# Patient Record
Sex: FEMALE | Race: WHITE | NOT HISPANIC OR LATINO | URBAN - METROPOLITAN AREA
[De-identification: names, ages, dates, MRNs, and addresses within clinical notes are randomized per-mention and may not be internally consistent; named-entity substitution may affect disease eponyms.]

---

## 2017-05-02 ENCOUNTER — EMERGENCY (EMERGENCY)
Facility: HOSPITAL | Age: 82
LOS: 1 days | Discharge: HOME | End: 2017-05-02
Attending: EMERGENCY MEDICINE
Payer: MEDICARE

## 2017-05-02 VITALS
SYSTOLIC BLOOD PRESSURE: 147 MMHG | HEART RATE: 77 BPM | TEMPERATURE: 97 F | DIASTOLIC BLOOD PRESSURE: 79 MMHG | OXYGEN SATURATION: 98 % | RESPIRATION RATE: 18 BRPM

## 2017-05-02 VITALS
DIASTOLIC BLOOD PRESSURE: 85 MMHG | HEIGHT: 64 IN | OXYGEN SATURATION: 95 % | TEMPERATURE: 98 F | HEART RATE: 79 BPM | RESPIRATION RATE: 18 BRPM | SYSTOLIC BLOOD PRESSURE: 160 MMHG | WEIGHT: 119.93 LBS

## 2017-05-02 DIAGNOSIS — S00.03XA CONTUSION OF SCALP, INITIAL ENCOUNTER: ICD-10-CM
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
## 2017-05-02 DIAGNOSIS — M62.81 MUSCLE WEAKNESS (GENERALIZED): ICD-10-CM
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
## 2017-05-02 DIAGNOSIS — Z79.899 OTHER LONG TERM (CURRENT) DRUG THERAPY: ICD-10-CM

## 2017-05-02 PROCEDURE — 99284 EMERGENCY DEPT VISIT MOD MDM: CPT

## 2017-05-02 PROCEDURE — 99284 EMERGENCY DEPT VISIT MOD MDM: CPT | Mod: 25

## 2017-05-02 PROCEDURE — 70450 CT HEAD/BRAIN W/O DYE: CPT | Mod: 26

## 2017-05-02 PROCEDURE — 73562 X-RAY EXAM OF KNEE 3: CPT

## 2017-05-02 PROCEDURE — 73562 X-RAY EXAM OF KNEE 3: CPT | Mod: 26,50

## 2017-05-02 PROCEDURE — 70450 CT HEAD/BRAIN W/O DYE: CPT

## 2017-05-02 RX ORDER — HYDROXYCHLOROQUINE SULFATE 200 MG
0 TABLET ORAL
Qty: 0 | Refills: 0 | COMMUNITY

## 2017-05-02 RX ORDER — SIMVASTATIN 20 MG/1
0 TABLET, FILM COATED ORAL
Qty: 0 | Refills: 0 | COMMUNITY

## 2017-05-02 RX ORDER — METHOTREXATE 2.5 MG/1
0 TABLET ORAL
Qty: 0 | Refills: 0 | COMMUNITY

## 2017-05-02 RX ORDER — FOLIC ACID 0.8 MG
0 TABLET ORAL
Qty: 0 | Refills: 0 | COMMUNITY

## 2017-05-02 RX ORDER — ACETAMINOPHEN 500 MG
650 TABLET ORAL ONCE
Qty: 0 | Refills: 0 | Status: COMPLETED | OUTPATIENT
Start: 2017-05-02 | End: 2017-05-02

## 2017-05-02 RX ADMIN — Medication 650 MILLIGRAM(S): at 18:05

## 2017-05-02 NOTE — ED ADULT NURSE NOTE - OBJECTIVE STATEMENT
Patient found laying in stretcher, awake, alert, and oriented times 3,  breathing unlabored.  Patient states her left knee gave out and fell, hitting head on bathroom sink.  No bruising or deformity noted to chest or back.  bump to head noted to posterior right head.  No LOC.  patient remembers incident

## 2017-05-02 NOTE — ED PROVIDER NOTE - OBJECTIVE STATEMENT
89 y/o female with a h/o arthritis and she says she goes to [facility] and uses a walker and today she was there and her left knee gave out and she fell and hit the back of her head and c/o small bump on the back of her head and today she also noted her left knee gave out and her daughter says this has happened before no loc and no focal weakness

## 2017-05-02 NOTE — ED ADULT NURSE REASSESSMENT NOTE - NS ED NURSE REASSESS COMMENT FT1
Care assumed from off-going RN at this time, charting as noted. Pt is resting comfortably in stretcher with family at the bedside. Plan of care explained, pt is waiting for head CT results. Will continue to monitor.

## 2017-05-02 NOTE — ED PROVIDER NOTE - PROGRESS NOTE DETAILS
ctr scan as noted discussed yoselin nielsen neurosurgery states not related to trauma but atrophy and age ok to d/c

## 2017-05-02 NOTE — ED ADULT TRIAGE NOTE - CHIEF COMPLAINT QUOTE
PAtient BIBA, patient states her left leg became weak today, states last known well 12pm, patient states she fell from left leg becoming weak and hit back of head, denies LOC, denies taking any blood thinners, Dr Harden to evaluate to r/o stroke

## 2017-05-10 ENCOUNTER — APPOINTMENT (OUTPATIENT)
Dept: ORTHOPEDIC SURGERY | Facility: CLINIC | Age: 82
End: 2017-05-10

## 2017-05-10 VITALS
WEIGHT: 139 LBS | SYSTOLIC BLOOD PRESSURE: 175 MMHG | DIASTOLIC BLOOD PRESSURE: 83 MMHG | BODY MASS INDEX: 25.58 KG/M2 | HEART RATE: 73 BPM | HEIGHT: 62 IN

## 2017-05-10 DIAGNOSIS — Z60.2 PROBLEMS RELATED TO LIVING ALONE: ICD-10-CM

## 2017-05-10 DIAGNOSIS — Z87.39 PERSONAL HISTORY OF OTHER DISEASES OF THE MUSCULOSKELETAL SYSTEM AND CONNECTIVE TISSUE: ICD-10-CM

## 2017-05-10 DIAGNOSIS — Z78.9 OTHER SPECIFIED HEALTH STATUS: ICD-10-CM

## 2017-05-10 DIAGNOSIS — M17.0 BILATERAL PRIMARY OSTEOARTHRITIS OF KNEE: ICD-10-CM

## 2017-05-10 DIAGNOSIS — Z87.891 PERSONAL HISTORY OF NICOTINE DEPENDENCE: ICD-10-CM

## 2017-05-10 RX ORDER — MULTIVITAMIN
TABLET ORAL
Refills: 0 | Status: ACTIVE | COMMUNITY

## 2017-05-10 RX ORDER — FOLIC ACID 20 MG
CAPSULE ORAL
Refills: 0 | Status: ACTIVE | COMMUNITY

## 2017-05-10 RX ORDER — HYDROXYCHLOROQUINE SULFATE 200 MG/1
TABLET ORAL
Refills: 0 | Status: ACTIVE | COMMUNITY

## 2017-05-10 RX ORDER — ALENDRONATE SODIUM 35 MG/1
TABLET ORAL
Refills: 0 | Status: ACTIVE | COMMUNITY

## 2017-05-10 SDOH — SOCIAL STABILITY - SOCIAL INSECURITY: PROBLEMS RELATED TO LIVING ALONE: Z60.2

## 2019-07-16 PROBLEM — M06.9 RHEUMATOID ARTHRITIS, UNSPECIFIED: Chronic | Status: ACTIVE | Noted: 2017-05-02

## 2019-07-31 PROBLEM — S22.009A FRACTURE, THORACIC VERTEBRA: Status: ACTIVE | Noted: 2019-07-31

## 2019-08-02 ENCOUNTER — APPOINTMENT (OUTPATIENT)
Dept: ORTHOPEDIC SURGERY | Facility: CLINIC | Age: 84
End: 2019-08-02
Payer: MEDICARE

## 2019-08-02 VITALS
HEART RATE: 78 BPM | SYSTOLIC BLOOD PRESSURE: 146 MMHG | BODY MASS INDEX: 25.58 KG/M2 | DIASTOLIC BLOOD PRESSURE: 74 MMHG | WEIGHT: 139 LBS | HEIGHT: 62 IN

## 2019-08-02 DIAGNOSIS — Z82.62 FAMILY HISTORY OF OSTEOPOROSIS: ICD-10-CM

## 2019-08-02 DIAGNOSIS — S22.009A UNSPECIFIED FRACTURE OF UNSPECIFIED THORACIC VERTEBRA, INITIAL ENCOUNTER FOR CLOSED FRACTURE: ICD-10-CM

## 2019-08-02 DIAGNOSIS — Z87.39 PERSONAL HISTORY OF OTHER DISEASES OF THE MUSCULOSKELETAL SYSTEM AND CONNECTIVE TISSUE: ICD-10-CM

## 2019-08-02 DIAGNOSIS — Z85.9 PERSONAL HISTORY OF MALIGNANT NEOPLASM, UNSPECIFIED: ICD-10-CM

## 2019-08-02 PROCEDURE — 99214 OFFICE O/P EST MOD 30 MIN: CPT

## 2019-08-02 PROCEDURE — 72070 X-RAY EXAM THORAC SPINE 2VWS: CPT

## 2019-08-02 NOTE — DISCUSSION/SUMMARY
[de-identified] : I would recommend conservative care with this pt, including home therapy, topical modalities, and the use of a walker. She may FU on a PRN basis for c/o back pain.

## 2019-08-02 NOTE — HISTORY OF PRESENT ILLNESS
[Ataxia] : no ataxia [de-identified] : 89 year old female presents with an evaluation for a 3+ yr hx of thoracic pain, thoracic fx. Recent CT Chest/Abdomen that diagnosed rib fractures. Pt states her pain is very well controlled. She denies acute flare up of thoracic pain. Pt ambulates chronically with a waker which I think is age appropriate.  [Loss of Dexterity] : good dexterity [Incontinence] : no incontinence [Urinary Ret.] : no urinary retention

## 2019-08-02 NOTE — PHYSICAL EXAM
[de-identified] : CONSTITUTIONAL: The patient is a very pleasant individual who is well-nourished and who appears stated age. Seated and comfortable. She schuster shave a walker in the room for ambulation. \par PSYCHIATRIC: The patient is alert and oriented X 3 and in no apparent distress, and participates with orthopedic evaluation well.\par HEAD: Atraumatic and is nonsyndromic in appearance.\par EENT: No visible thyromegaly, EOMI.\par RESPIRATORY: Respiratory rate is regular, not dyspneic on examination.\par LYMPHATICS: There is no inguinal lymphadenopathy\par INTEGUMENTARY: Skin is clean, dry, and intact about the bilateral lower extremities and lumbar spine.\par VASCULAR: There is brisk capillary refill about the bilateral lower extremities.\par NEUROLOGIC: There are no pathologic reflexes. There is no decrease in sensation of the bilateral lower extremities on Wartenberg pinwheel examination. Deep tendon reflexes are well maintained at 2+/4 of the bilateral lower extremities and are symmetric. \par MUSCULOSKELETAL: There is no visible muscular atrophy. Manual motor strength is well maintained in the bilateral lower extremities. Range of motion of lumbar spine is well maintained. The patient ambulates in a non-myelopathic manner. Negative tension sign and straight leg raise. She denies progressive weakness/sensory loss.bilaterally. Quad extension, ankle dorsiflexion, EHL, plantar flexion, and ankle eversion are well preserved. Normal secondary orthopaedic exam of bilateral hips, greater trochanteric area, knees and ankles.  [de-identified] : CT scan [facility] 7/13/19 demonstrated thoracic compression fractures at T8 and T12 that appear chronic. \par

## 2019-08-02 NOTE — ADDENDUM
[FreeTextEntry1] : Documented by Montrell Sparks acting as a scribe for Dr. Gary Vazquez on 08/02/2019.\par \par All medical record entries made by the Scribe were at my, Dr. Gary Vazquez, direction and personally dictated by me on 08/02/2019. I have reviewed the chart and agree that the record accurately reflects my personal performance of the history, physical exam, assessment and plan. I have also personally directed, reviewed, and agreed with the chart.

## 2019-10-02 PROBLEM — Z60.2 PERSON LIVING ALONE: Status: ACTIVE | Noted: 2017-05-10

## 2022-01-20 ENCOUNTER — APPOINTMENT (OUTPATIENT)
Dept: RADIATION ONCOLOGY | Facility: CLINIC | Age: 87
End: 2022-01-20
Payer: MEDICARE

## 2022-01-20 ENCOUNTER — OUTPATIENT (OUTPATIENT)
Dept: OUTPATIENT SERVICES | Facility: HOSPITAL | Age: 87
LOS: 1 days | Discharge: HOME | End: 2022-01-20
Payer: OTHER MISCELLANEOUS

## 2022-01-20 VITALS
SYSTOLIC BLOOD PRESSURE: 121 MMHG | HEIGHT: 62 IN | HEART RATE: 92 BPM | OXYGEN SATURATION: 98 % | BODY MASS INDEX: 18.03 KG/M2 | WEIGHT: 98 LBS | DIASTOLIC BLOOD PRESSURE: 72 MMHG

## 2022-01-20 DIAGNOSIS — R91.8 OTHER NONSPECIFIC ABNORMAL FINDING OF LUNG FIELD: ICD-10-CM

## 2022-01-20 DIAGNOSIS — Z85.810 PERSONAL HISTORY OF MALIGNANT NEOPLASM OF TONGUE: ICD-10-CM

## 2022-01-20 DIAGNOSIS — C02.9 MALIGNANT NEOPLASM OF TONGUE, UNSPECIFIED: ICD-10-CM

## 2022-01-20 DIAGNOSIS — C34.81 MALIGNANT NEOPLASM OF OVERLAPPING SITES OF RIGHT BRONCHUS AND LUNG: ICD-10-CM

## 2022-01-20 PROCEDURE — 77263 THER RADIOLOGY TX PLNG CPLX: CPT

## 2022-01-20 PROCEDURE — 99205 OFFICE O/P NEW HI 60 MIN: CPT | Mod: 25

## 2022-01-20 RX ORDER — METHOTREXATE 2.5 MG/1
TABLET ORAL
Refills: 0 | Status: COMPLETED | COMMUNITY
End: 2022-01-20

## 2022-01-20 NOTE — REVIEW OF SYSTEMS
[Recent Change In Weight] : ~T recent weight change [Hearing Disturbances] : hearing disturbances [Shortness Of Breath] : shortness of breath [Cough] : cough [SOB on Exertion] : shortness of breath during exertion [Negative] : Allergic/Immunologic [Dysphagia: Grade 1 - Symptomatic, able to eat regular diet] : Dysphagia: Grade 1 - Symptomatic, able to eat regular diet [Edema Limbs: Grade 0] : Edema Limbs: Grade 0  [Fatigue: Grade 0] : Fatigue: Grade 0 [Localized Edema: Grade 0] : Localized Edema: Grade 0  [Neck Edema: Grade 0] : Neck Edema: Grade 0 [Cough: Grade 1 - Mild symptoms; nonprescription intervention indicated] : Cough: Grade 1 - Mild symptoms; nonprescription intervention indicated [Dyspnea: Grade 1 - Shortness of breath with moderate exertion] : Dyspnea: Grade 1 - Shortness of breath with moderate exertion [FreeTextEntry4] : aides both ears

## 2022-01-20 NOTE — REASON FOR VISIT
[Consideration for Non-Curative Therapy] : consideration for non-curative therapy for [Lung Cancer] : lung cancer [Family Member] : family member

## 2022-01-20 NOTE — OB/GYN HISTORY
[Currently In Menopause] : currently in menopause [___] : Living: [unfilled] [History of Birth Control Pills] : Patient has no history of taking birth control pills [History of Hormone Replacement Therapy] : no history of hormone replacement therapy [Experiencing Menopausal Sxs] : not experiencing menopausal symptoms

## 2022-01-20 NOTE — HISTORY OF PRESENT ILLNESS
[FreeTextEntry1] : 89 year old woman presents today for consultation regarding irradiation therapy for palliation of a lung mass causing obstruction.\par \par Per the patient and her family, she was in her usual state of health until approximately 2 weeks ago when she noted progressive worsening shortness of breath.\par \par Patient also notes pain in her back since Sunday, radiating to the right chest.  Family also noted increased lower extremity edema, right greater than left.\par \par She was seen by Dr. Rae Mehta on 1/18/22, and recommended to undergo chest x-ray.\par \par 1/18/22 CXR showed a right perihilar masslike opacity.\par \par She saw her PMD, Dr. Adrian Lombardi.\par \par 1/19/22 CT chest showed a bulky right hilar mass measuring 7.8 x 5.6 x 5.5 CM, partially involving the right apical bronchus, and obstructing the anterior right upper lobe bronchus.  There was mediastinal and hilar lymphadenopathy, and numerous additional masses were present within both lung fields.  Compression fractures deformities at T8 and T12 were stable.\par \par Of note, comparison was made to 7/13/19 CT chest, which previously demonstrated to right upper lobe lobular lesions measuring 1.8 and 1.6 CM.\par \par Additionally, patient with history of right oral tongue squamous cell carcinoma in 2001, s/p excision at [facility].  No adjuvant therapy.\par \par Currently reports dyspnea at rest and with minimal exertion, dry cough, and intermittent orthopnea.  She was noted to have 10-15 pound weight loss over the past few months.  Denies headaches, nausea, vomiting, vision changes, or ataxia.\par

## 2022-01-20 NOTE — LETTER CLOSING
[Consult Closing:] : Thank you for allowing me to participate in the care of this patient.  If you have any questions, please do not hesitate to contact me. [Sincerely yours,] : Sincerely yours, [FreeTextEntry3] : Marco Chery MD\par  of Radiation Medicine, Quality and Safety\par  of Radiation Medicine\par [facility] [employer] [facility]\par [facility]\par [facility]\par

## 2022-01-20 NOTE — DISEASE MANAGEMENT
[Clinical] : TNM Stage: c [IV] : IV [FreeTextEntry4] : clinical lung cancer [TTNM] : - [NTNM] : - [MTNM] : 1

## 2022-01-20 NOTE — PHYSICAL EXAM
[Normal] : oriented to person, place and time, the affect was normal, the mood was normal and not anxious [Extraocular Movements] : extraocular movements were intact [Respiration, Rhythm And Depth] : normal respiratory rhythm and effort [Exaggerated Use Of Accessory Muscles For Inspiration] : no accessory muscle use [Heart Rate And Rhythm] : heart rate and rhythm were normal [Bowel Sounds] : normal bowel sounds [Abdomen Soft] : soft [Nondistended] : nondistended [Abdomen Tenderness] : non-tender [de-identified] : absent RUL, diminished elsewhere; no wheeze; dyspnea at rest [de-identified] : bilateral LE edema

## 2022-01-20 NOTE — VITALS
[Least Pain Intensity: 0/10] : 0/10 [Date: ____________] : Patient's last distress assessment performed on [unfilled]. [Patient Refusal] : Patient refused psychosocial distress assessment [Maximal Pain Intensity: 3/10] : 3/10 [70: Cares for self; unalbe to carry on normal activity or do active work.] : 70: Cares for self; unable to carry on normal activity or do active work.

## 2022-01-21 PROCEDURE — 77307 TELETHX ISODOSE PLAN CPLX: CPT | Mod: 26

## 2022-01-21 PROCEDURE — 77334 RADIATION TREATMENT AID(S): CPT | Mod: 26

## 2022-01-21 PROCEDURE — 77290 THER RAD SIMULAJ FIELD CPLX: CPT | Mod: 26

## 2022-01-24 PROCEDURE — 77280 THER RAD SIMULAJ FIELD SMPL: CPT | Mod: 26

## 2022-01-25 PROCEDURE — 77427 RADIATION TX MANAGEMENT X5: CPT

## 2022-01-28 ENCOUNTER — NON-APPOINTMENT (OUTPATIENT)
Age: 87
End: 2022-01-28

## 2022-01-28 VITALS
DIASTOLIC BLOOD PRESSURE: 72 MMHG | TEMPERATURE: 97 F | HEIGHT: 62 IN | WEIGHT: 95 LBS | SYSTOLIC BLOOD PRESSURE: 113 MMHG | BODY MASS INDEX: 17.48 KG/M2 | OXYGEN SATURATION: 97 % | RESPIRATION RATE: 18 BRPM | HEART RATE: 85 BPM

## 2022-01-28 NOTE — DISEASE MANAGEMENT
[Clinical] : TNM Stage: c [IV] : IV [FreeTextEntry4] : clinical lung cancer [TTNM] : - [NTNM] : - [MTNM] : 1 [de-identified] : 4595 [de-identified] : 2000 [de-identified] : right lung

## 2022-01-28 NOTE — REASON FOR VISIT
[Routine On-Treatment] : a routine on-treatment visit for [Lung Cancer] : lung cancer [Family Member] : family member

## 2022-01-28 NOTE — REVIEW OF SYSTEMS
[Fatigue: Grade 1 - Fatigue relieved by rest] : Fatigue: Grade 1 - Fatigue relieved by rest [Cough: Grade 1 - Mild symptoms; nonprescription intervention indicated] : Cough: Grade 1 - Mild symptoms; nonprescription intervention indicated [Dyspnea: Grade 3 - Shortness of breath at rest; limiting self care ADL] : Dyspnea: Grade 3 - Shortness of breath at rest; limiting self care ADL [Skin Hyperpigmentation: Grade 0] : Skin Hyperpigmentation: Grade 0

## 2022-01-28 NOTE — PHYSICAL EXAM
[Extraocular Movements] : extraocular movements were intact [] : no respiratory distress [Respiration, Rhythm And Depth] : normal respiratory rhythm and effort [Exaggerated Use Of Accessory Muscles For Inspiration] : no accessory muscle use [Normal] : normal skin color and pigmentation and no rash [de-identified] : decreased with course breath sounds

## 2022-03-01 ENCOUNTER — APPOINTMENT (OUTPATIENT)
Dept: RADIATION ONCOLOGY | Facility: CLINIC | Age: 87
End: 2022-03-01

## 2022-03-01 NOTE — REVIEW OF SYSTEMS
[Fatigue] : fatigue [Shortness Of Breath] : shortness of breath [Dysphagia: Grade 1 - Symptomatic, able to eat regular diet] : Dysphagia: Grade 1 - Symptomatic, able to eat regular diet [Dyspnea: Grade 3 - Shortness of breath at rest; limiting self care ADL] : Dyspnea: Grade 3 - Shortness of breath at rest; limiting self care ADL

## 2022-03-01 NOTE — VITALS
[Maximal Pain Intensity: 6/10] : 6/10 [Least Pain Intensity: 6/10] : 6/10 [Pain Description/Quality: ___] : Pain description/quality: [unfilled] [Pain Duration: ___] : Pain duration: [unfilled] [Pain Location: ___] : Pain Location: [unfilled] [Pain Interferes with ADLs] : Pain interferes with activities of daily living. [Opioid] : opioid [50: Requires considerable assistance and frequent medical care.] : 50: Requires considerable assistance and frequent medical care. [ECOG Performance Status: 4 - Completely disabled. Cannot carry on any self care. Totally confined to bed or chair] : Performance Status: 4 - Completely disabled. Cannot carry on any self care. Totally confined to bed or chair

## 2022-03-01 NOTE — HISTORY OF PRESENT ILLNESS
[Home] : at home, [unfilled] , at the time of the visit. [Medical Office: ([facility])___] : at the medical office located in  [Family Member] : family member [Other:____] : [unfilled] [Verbal consent obtained from patient] : the patient, [unfilled] [FreeTextEntry1] : 89 year old woman with imaging findings suggestive of metastatic primary lung cancer, completing palliative radiation to the right lung on 1/31/22\par patient is currently on hospice \par family reports she remains on O2 3l NC for dyspnea, she is experiencing  odynophagia dysphagia, diffuse pain & extreme fatigue\par \par Denies  cough